# Patient Record
Sex: FEMALE | Race: WHITE
[De-identification: names, ages, dates, MRNs, and addresses within clinical notes are randomized per-mention and may not be internally consistent; named-entity substitution may affect disease eponyms.]

---

## 2019-04-23 ENCOUNTER — HOSPITAL ENCOUNTER (OUTPATIENT)
Dept: HOSPITAL 80 - FCATH | Age: 74
Discharge: HOME | End: 2019-04-23
Attending: INTERNAL MEDICINE
Payer: COMMERCIAL

## 2019-04-23 DIAGNOSIS — I34.0: ICD-10-CM

## 2019-04-23 DIAGNOSIS — I48.0: Primary | ICD-10-CM

## 2019-04-23 DIAGNOSIS — I48.92: ICD-10-CM

## 2019-04-23 PROCEDURE — B245ZZ4 ULTRASONOGRAPHY OF LEFT HEART, TRANSESOPHAGEAL: ICD-10-PCS | Performed by: INTERNAL MEDICINE

## 2019-04-23 NOTE — ECHO
[email or web address]





[address] [address] 

Main: [phone] 



Echocardiography Examination 

Transesophageal 



Name:            MELISSA LIM                       MR#:

[id number]

Study Date:      04/23/2019                            Study Time:

12:06 PM

YOB: 1945                            Age:

73 year(s)

Height:             (  )                               Weight:

(  )

BSA:                                                   Gender:

Female

Examination:     PAM                                   Contrast: 

Image Quality:   Adequate                              Rhythm: 

Heart Rate:                                            BP:

/

Indication:      Eval MV 



Procedure Staff 

Referring Physician: 

Echocardiographer:         Catie Kaye BULL 

Reading Physician:         Joanna Cruz MD 

Requesting Provider: 

Ordering Physician:        Connor Prescott MD 

Indication:                Eval MV 

Acute complication:         None 



Measurements 

Chambers                                            AV/MV 

Label                 Value       Normal Value           Label

Value       Normal Value

LVOTd                 2 cm        (1.8cm - 2cm)          AV PGmax

4 mmHg

LVOT VTI              9.3 cm      (18cm - 22cm)          AV Vmax

0.94 m/s

LVOT PGmean           1 mmHg                             MR Reg.

Volume       8 ml

LVOT Vmean            0.42 m/s                           MR Vmax

4.38 m/s



MR VTI               88.3 cm 

MR (ERO)             0.09 cm2 

MR PISA Radius       0.4 cm 

MR PISA Alias V.     38.5 cm/s 



Conclusions 

1.  The left ventricle is normal in size and systolic function.  No

regional wall motion abnormalities.

2. There is no thrombus in the left atrial appendage. 

3. Negative bubble study. 

4.  The mitral valve is structurally normal.  Mild mitral

regurgitation.

5.  The aortic valve is normal in structure and function. 

6.  Trivial tricuspid regurgitation.  Unable to estimate PA systolic

pressure.



Patient: MELISSA LIM                     MRN: [id number]

Study Date: 04/23/2019   Page 1 of 2

12:06 PM 









Findings 



Left Ventricle: 

Left ventricle is normal in size. Normal global systolic left

ventricular function.

Left Atrium Appendage: 

Normal PW-Doppler flow pattern. Good color flow doppler in the left

atrial appendage. No thrombus is identified.

IAS: 

An agitated saline study was performed and was negative for

intracardiac shunting.

Mitral Valve: 



Mild mitral regurgitation.

Aortic Valve: 

Aortic leaflets are structurally normal. No significant aortic valve

regurgitation. There is no aortic stenosis.

Tricuspid Valve: 

Tricuspid valve leaflets are structurally normal. Trivial tricuspid

regurgitation.

Pulmonic Valve: 

Pulmonic leaflets are structurally normal. Mild pulmonic valve

regurgitation is present.

Aorta: 

Descending aorta is normal in size.  



Exam Details 

Procedure Ordered:         PAM 

Procedure Status:          Routine study 

Image Quality:             Adequate 

Consent:                   Risks, alternatives of procedure explained

to patient, informed consent obtained

Probe Insertion:           Attending cardiologist 

Facility Location:         [facility] 



Electronically signed by Joanna Cruz MD on 04/23/2019 at 02:31 PM 

(No Signature Object) 



Patient: MELISSA LIM                     MRN: [id number]

Study Date: 04/23/2019   Page 2 of 2

12:06 PM 







[id number]

## 2019-04-23 NOTE — PDTEE1
PAM Cardioversion Procedure


Procedure: transesophageal echo


Indications: atrial fibrillation


Consent: signed and in chart


Anticoagulation: eliquis


Procedural Details: 


Sedation was provided by the anesthesia service.  Pads were placed in anterior-

posterior position.  PAM probe was advanced and standard images obtained.  

There is no evidence of left atrial or left atrial appendage thrombus.  

Cardioversion was not performed as the patient converted spontaneously to to 

normal sinus rhythm.


Conclusions: successful PAM

## 2019-04-23 NOTE — PDANEPAE
ANE Past Medical History





- Cardiovascular History


Hx Hypertension: No


Hx Arrhythmias: Yes


Hx Chest Pain: No


Hx Coronary Artery / Peripheral Vascular Disease: No





- Pulmonary History


Hx COPD: No


Hx Asthma/Reactive Airway Disease: No


Hx Sleep Apnea: No





- Neurologic History


Hx Cerebrovascular Accident: No


Hx Seizures: No





- Endocrine History


Hx Diabetes: No


Hypothyroid: No





- Renal History


Hx Renal Disorders: No





- Liver History


Hx Hepatic Disorders: No





- Neurological & Psychiatric Hx


Hx Neurological and Psychiatric Disorders: No





ANE Review of Systems


Review of Systems: 








ANE Patient History





- Allergies


Allergies/Adverse Reactions: 








No Known Allergies Allergy (Unverified 04/23/19 06:43)


 








- Anes Hx


Anes Hx: no prior problems





- Smoking Hx


Smoking Status: Never smoked





- Family Anes Hx


Family Anes Hx: neg - N/A





ANE Labs/Vital Signs





- Vital Signs


Height: 175.26 cm


Weight: 56.245 kg





ANE Physical Exam





- Airway


Mallampati Score: Class 1


Mouth exam: normal dental/mouth exam





- Pulmonary


Pulmonary: no respiratory distress, no rales or rhonchi, clear to auscultation





- Cardiovascular


Cardiovascular: regular rate and rhythym, no murmur, rub, or gallop





- ASA Status


ASA Status: II





ANE Anesthesia Plan


Anesthesia Plan: GA with mask


Total IV Anesthesia: Yes

## 2019-04-23 NOTE — PDGENHP
History & Physical


Chief Complaint: Moderate MR


History of Present Illness: Moderate MR and mild TR and TTE, hx of AF, atrial 

flutter


Relevant Physical Exam: A&Ox4, no apparent distress, lungs CTA, regular rate 

and rhythm, S1, S2


Cardiorespiratory Assessment: Proceed with PAM to evaluate MR as planned for 

today